# Patient Record
Sex: FEMALE | Race: WHITE | HISPANIC OR LATINO
[De-identification: names, ages, dates, MRNs, and addresses within clinical notes are randomized per-mention and may not be internally consistent; named-entity substitution may affect disease eponyms.]

---

## 2017-01-27 ENCOUNTER — APPOINTMENT (OUTPATIENT)
Dept: OPHTHALMOLOGY | Facility: CLINIC | Age: 62
End: 2017-01-27

## 2017-01-27 ENCOUNTER — OUTPATIENT (OUTPATIENT)
Dept: OUTPATIENT SERVICES | Facility: HOSPITAL | Age: 62
LOS: 1 days | End: 2017-01-27

## 2017-01-27 DIAGNOSIS — Z98.89 OTHER SPECIFIED POSTPROCEDURAL STATES: Chronic | ICD-10-CM

## 2017-02-08 DIAGNOSIS — H40.003 PREGLAUCOMA, UNSPECIFIED, BILATERAL: ICD-10-CM

## 2017-02-22 ENCOUNTER — OUTPATIENT (OUTPATIENT)
Dept: OUTPATIENT SERVICES | Facility: HOSPITAL | Age: 62
LOS: 1 days | End: 2017-02-22

## 2017-02-22 ENCOUNTER — APPOINTMENT (OUTPATIENT)
Dept: OBGYN | Facility: HOSPITAL | Age: 62
End: 2017-02-22

## 2017-02-22 VITALS
WEIGHT: 169 LBS | SYSTOLIC BLOOD PRESSURE: 112 MMHG | HEIGHT: 68 IN | BODY MASS INDEX: 25.61 KG/M2 | DIASTOLIC BLOOD PRESSURE: 75 MMHG | HEART RATE: 70 BPM

## 2017-02-22 DIAGNOSIS — Z98.89 OTHER SPECIFIED POSTPROCEDURAL STATES: Chronic | ICD-10-CM

## 2017-02-23 ENCOUNTER — RESULT REVIEW (OUTPATIENT)
Age: 62
End: 2017-02-23

## 2017-02-23 DIAGNOSIS — Z01.419 ENCOUNTER FOR GYNECOLOGICAL EXAMINATION (GENERAL) (ROUTINE) WITHOUT ABNORMAL FINDINGS: ICD-10-CM

## 2017-02-23 DIAGNOSIS — R32 UNSPECIFIED URINARY INCONTINENCE: ICD-10-CM

## 2017-02-28 LAB — CYTOLOGY SPEC DOC CYTO: SIGNIFICANT CHANGE UP

## 2017-03-01 LAB — HPV HIGH+LOW RISK DNA PNL CVX: SIGNIFICANT CHANGE UP

## 2017-03-27 ENCOUNTER — APPOINTMENT (OUTPATIENT)
Dept: UROGYNECOLOGY | Facility: CLINIC | Age: 62
End: 2017-03-27

## 2017-03-27 VITALS
WEIGHT: 170 LBS | BODY MASS INDEX: 25.76 KG/M2 | HEIGHT: 68 IN | DIASTOLIC BLOOD PRESSURE: 76 MMHG | SYSTOLIC BLOOD PRESSURE: 130 MMHG

## 2017-03-27 DIAGNOSIS — Z82.5 FAMILY HISTORY OF ASTHMA AND OTHER CHRONIC LOWER RESPIRATORY DISEASES: ICD-10-CM

## 2017-03-27 DIAGNOSIS — Z83.42 FAMILY HISTORY OF FAMILIAL HYPERCHOLESTEROLEMIA: ICD-10-CM

## 2017-03-27 DIAGNOSIS — Z80.3 FAMILY HISTORY OF MALIGNANT NEOPLASM OF BREAST: ICD-10-CM

## 2017-03-27 DIAGNOSIS — Z82.49 FAMILY HISTORY OF ISCHEMIC HEART DISEASE AND OTHER DISEASES OF THE CIRCULATORY SYSTEM: ICD-10-CM

## 2017-03-27 DIAGNOSIS — Z84.1 FAMILY HISTORY OF DISORDERS OF KIDNEY AND URETER: ICD-10-CM

## 2017-03-27 LAB
BILIRUB UR QL STRIP: NORMAL
CLARITY UR: CLEAR
COLLECTION METHOD: NORMAL
GLUCOSE UR-MCNC: NORMAL
HCG UR QL: 0.2 EU/DL
HGB UR QL STRIP.AUTO: NORMAL
KETONES UR-MCNC: NORMAL
LEUKOCYTE ESTERASE UR QL STRIP: NORMAL
NITRITE UR QL STRIP: NORMAL
PH UR STRIP: 5.5
PROT UR STRIP-MCNC: NORMAL
SP GR UR STRIP: 1

## 2017-03-28 ENCOUNTER — RESULT REVIEW (OUTPATIENT)
Age: 62
End: 2017-03-28

## 2017-03-28 LAB
APPEARANCE: CLEAR
BACTERIA: NEGATIVE
BILIRUBIN URINE: NEGATIVE
BLOOD URINE: NEGATIVE
COLOR: YELLOW
GLUCOSE QUALITATIVE U: NORMAL MG/DL
KETONES URINE: NEGATIVE
LEUKOCYTE ESTERASE URINE: NEGATIVE
MICROSCOPIC-UA: NORMAL
NITRITE URINE: NEGATIVE
PH URINE: 6
PROTEIN URINE: NEGATIVE MG/DL
RED BLOOD CELLS URINE: 0 /HPF
SPECIFIC GRAVITY URINE: 1.01
SQUAMOUS EPITHELIAL CELLS: 0 /HPF
UROBILINOGEN URINE: NORMAL MG/DL
WHITE BLOOD CELLS URINE: 0 /HPF

## 2017-03-29 LAB — BACTERIA UR CULT: NORMAL

## 2017-04-17 ENCOUNTER — APPOINTMENT (OUTPATIENT)
Dept: UROGYNECOLOGY | Facility: CLINIC | Age: 62
End: 2017-04-17

## 2017-04-24 ENCOUNTER — APPOINTMENT (OUTPATIENT)
Dept: UROGYNECOLOGY | Facility: CLINIC | Age: 62
End: 2017-04-24

## 2017-05-01 ENCOUNTER — APPOINTMENT (OUTPATIENT)
Dept: UROGYNECOLOGY | Facility: CLINIC | Age: 62
End: 2017-05-01

## 2017-05-08 ENCOUNTER — APPOINTMENT (OUTPATIENT)
Dept: UROGYNECOLOGY | Facility: CLINIC | Age: 62
End: 2017-05-08

## 2017-05-15 ENCOUNTER — APPOINTMENT (OUTPATIENT)
Dept: UROGYNECOLOGY | Facility: CLINIC | Age: 62
End: 2017-05-15

## 2017-05-22 ENCOUNTER — APPOINTMENT (OUTPATIENT)
Dept: UROGYNECOLOGY | Facility: CLINIC | Age: 62
End: 2017-05-22

## 2017-05-22 RX ORDER — MIRABEGRON 25 MG/1
25 TABLET, FILM COATED, EXTENDED RELEASE ORAL
Qty: 15 | Refills: 0 | Status: DISCONTINUED | COMMUNITY
Start: 2017-03-27 | End: 2017-05-22

## 2017-05-31 ENCOUNTER — APPOINTMENT (OUTPATIENT)
Dept: UROGYNECOLOGY | Facility: CLINIC | Age: 62
End: 2017-05-31

## 2017-06-07 ENCOUNTER — APPOINTMENT (OUTPATIENT)
Dept: UROGYNECOLOGY | Facility: CLINIC | Age: 62
End: 2017-06-07

## 2017-06-14 ENCOUNTER — APPOINTMENT (OUTPATIENT)
Dept: UROGYNECOLOGY | Facility: CLINIC | Age: 62
End: 2017-06-14

## 2017-06-21 ENCOUNTER — APPOINTMENT (OUTPATIENT)
Dept: UROGYNECOLOGY | Facility: CLINIC | Age: 62
End: 2017-06-21

## 2017-06-28 ENCOUNTER — APPOINTMENT (OUTPATIENT)
Dept: UROGYNECOLOGY | Facility: CLINIC | Age: 62
End: 2017-06-28

## 2017-07-11 ENCOUNTER — APPOINTMENT (OUTPATIENT)
Dept: UROGYNECOLOGY | Facility: CLINIC | Age: 62
End: 2017-07-11

## 2017-07-21 ENCOUNTER — APPOINTMENT (OUTPATIENT)
Dept: UROGYNECOLOGY | Facility: CLINIC | Age: 62
End: 2017-07-21

## 2017-08-21 ENCOUNTER — APPOINTMENT (OUTPATIENT)
Dept: UROGYNECOLOGY | Facility: CLINIC | Age: 62
End: 2017-08-21
Payer: MEDICAID

## 2017-08-21 PROCEDURE — 64566 NEUROELTRD STIM POST TIBIAL: CPT

## 2017-08-24 ENCOUNTER — APPOINTMENT (OUTPATIENT)
Dept: OPHTHALMOLOGY | Facility: CLINIC | Age: 62
End: 2017-08-24

## 2017-08-24 ENCOUNTER — APPOINTMENT (OUTPATIENT)
Dept: ORTHOPEDIC SURGERY | Facility: CLINIC | Age: 62
End: 2017-08-24
Payer: MEDICAID

## 2017-08-24 ENCOUNTER — OUTPATIENT (OUTPATIENT)
Dept: OUTPATIENT SERVICES | Facility: HOSPITAL | Age: 62
LOS: 1 days | End: 2017-08-24

## 2017-08-24 VITALS
BODY MASS INDEX: 25.76 KG/M2 | WEIGHT: 170 LBS | SYSTOLIC BLOOD PRESSURE: 118 MMHG | HEART RATE: 83 BPM | HEIGHT: 68 IN | DIASTOLIC BLOOD PRESSURE: 83 MMHG

## 2017-08-24 DIAGNOSIS — Z98.89 OTHER SPECIFIED POSTPROCEDURAL STATES: Chronic | ICD-10-CM

## 2017-08-24 PROCEDURE — 99213 OFFICE O/P EST LOW 20 MIN: CPT

## 2017-08-25 ENCOUNTER — RX RENEWAL (OUTPATIENT)
Age: 62
End: 2017-08-25

## 2017-08-28 ENCOUNTER — MEDICATION RENEWAL (OUTPATIENT)
Age: 62
End: 2017-08-28

## 2017-09-12 DIAGNOSIS — H40.003 PREGLAUCOMA, UNSPECIFIED, BILATERAL: ICD-10-CM

## 2017-09-19 ENCOUNTER — APPOINTMENT (OUTPATIENT)
Dept: ORTHOPEDIC SURGERY | Facility: CLINIC | Age: 62
End: 2017-09-19
Payer: MEDICAID

## 2017-09-19 VITALS
HEART RATE: 76 BPM | WEIGHT: 170 LBS | DIASTOLIC BLOOD PRESSURE: 79 MMHG | BODY MASS INDEX: 25.76 KG/M2 | HEIGHT: 68 IN | SYSTOLIC BLOOD PRESSURE: 118 MMHG

## 2017-09-19 PROCEDURE — 20610 DRAIN/INJ JOINT/BURSA W/O US: CPT | Mod: LT

## 2017-09-25 ENCOUNTER — APPOINTMENT (OUTPATIENT)
Dept: UROGYNECOLOGY | Facility: CLINIC | Age: 62
End: 2017-09-25
Payer: MEDICAID

## 2017-09-25 PROCEDURE — 64566 NEUROELTRD STIM POST TIBIAL: CPT

## 2017-10-17 ENCOUNTER — APPOINTMENT (OUTPATIENT)
Dept: INTERNAL MEDICINE | Facility: CLINIC | Age: 62
End: 2017-10-17
Payer: MEDICAID

## 2017-10-17 ENCOUNTER — NON-APPOINTMENT (OUTPATIENT)
Age: 62
End: 2017-10-17

## 2017-10-17 VITALS
HEART RATE: 88 BPM | OXYGEN SATURATION: 97 % | SYSTOLIC BLOOD PRESSURE: 120 MMHG | WEIGHT: 170 LBS | HEIGHT: 68 IN | TEMPERATURE: 97.9 F | BODY MASS INDEX: 25.76 KG/M2 | DIASTOLIC BLOOD PRESSURE: 80 MMHG

## 2017-10-17 DIAGNOSIS — Z00.00 ENCOUNTER FOR GENERAL ADULT MEDICAL EXAMINATION W/OUT ABNORMAL FINDINGS: ICD-10-CM

## 2017-10-17 PROCEDURE — 90471 IMMUNIZATION ADMIN: CPT

## 2017-10-17 PROCEDURE — 36415 COLL VENOUS BLD VENIPUNCTURE: CPT

## 2017-10-17 PROCEDURE — 99396 PREV VISIT EST AGE 40-64: CPT | Mod: 25

## 2017-10-17 PROCEDURE — 93000 ELECTROCARDIOGRAM COMPLETE: CPT

## 2017-10-17 PROCEDURE — 90736 HZV VACCINE LIVE SUBQ: CPT

## 2017-10-17 RX ORDER — MIRABEGRON 25 MG/1
25 TABLET, FILM COATED, EXTENDED RELEASE ORAL
Qty: 90 | Refills: 1 | Status: DISCONTINUED | COMMUNITY
Start: 2017-08-21 | End: 2017-10-17

## 2017-10-17 RX ORDER — HYALURONATE SODIUM, STABILIZED 88 MG/4 ML
88 SYRINGE (ML) INTRAARTICULAR
Qty: 4 | Refills: 0 | Status: DISCONTINUED | OUTPATIENT
Start: 2017-08-24 | End: 2017-10-17

## 2017-10-17 RX ORDER — MULTIVITAMIN
CAPSULE ORAL
Refills: 0 | Status: ACTIVE | COMMUNITY
Start: 2017-10-17

## 2017-10-21 VITALS — DIASTOLIC BLOOD PRESSURE: 75 MMHG | SYSTOLIC BLOOD PRESSURE: 115 MMHG

## 2017-10-30 ENCOUNTER — APPOINTMENT (OUTPATIENT)
Dept: UROGYNECOLOGY | Facility: CLINIC | Age: 62
End: 2017-10-30
Payer: MEDICAID

## 2017-10-30 LAB
25(OH)D3 SERPL-MCNC: 33.7 NG/ML
ALBUMIN SERPL ELPH-MCNC: 4.3 G/DL
ALP BLD-CCNC: 65 U/L
ALT SERPL-CCNC: 78 U/L
ANION GAP SERPL CALC-SCNC: 13 MMOL/L
APPEARANCE: CLEAR
AST SERPL-CCNC: 47 U/L
BACTERIA: NEGATIVE
BASOPHILS # BLD AUTO: 0.03 K/UL
BASOPHILS NFR BLD AUTO: 0.7 %
BILIRUB SERPL-MCNC: 0.3 MG/DL
BILIRUBIN URINE: NEGATIVE
BLOOD URINE: NEGATIVE
BUN SERPL-MCNC: 14 MG/DL
CALCIUM SERPL-MCNC: 10 MG/DL
CHLORIDE SERPL-SCNC: 102 MMOL/L
CHOLEST SERPL-MCNC: 196 MG/DL
CHOLEST/HDLC SERPL: 3.2 RATIO
CO2 SERPL-SCNC: 27 MMOL/L
COLOR: YELLOW
CREAT SERPL-MCNC: 0.82 MG/DL
EOSINOPHIL # BLD AUTO: 0.04 K/UL
EOSINOPHIL NFR BLD AUTO: 0.9 %
GLUCOSE QUALITATIVE U: NEGATIVE MG/DL
GLUCOSE SERPL-MCNC: 102 MG/DL
HBA1C MFR BLD HPLC: 5.6 %
HCT VFR BLD CALC: 39.7 %
HDLC SERPL-MCNC: 62 MG/DL
HGB BLD-MCNC: 13.1 G/DL
IMM GRANULOCYTES NFR BLD AUTO: 0.2 %
KETONES URINE: NEGATIVE
LDLC SERPL CALC-MCNC: 104 MG/DL
LEUKOCYTE ESTERASE URINE: NEGATIVE
LYMPHOCYTES # BLD AUTO: 1.14 K/UL
LYMPHOCYTES NFR BLD AUTO: 26.7 %
MAN DIFF?: NORMAL
MCHC RBC-ENTMCNC: 31 PG
MCHC RBC-ENTMCNC: 33 GM/DL
MCV RBC AUTO: 93.9 FL
MICROSCOPIC-UA: NORMAL
MONOCYTES # BLD AUTO: 0.35 K/UL
MONOCYTES NFR BLD AUTO: 8.2 %
NEUTROPHILS # BLD AUTO: 2.7 K/UL
NEUTROPHILS NFR BLD AUTO: 63.3 %
NITRITE URINE: NEGATIVE
PH URINE: 5.5
PLATELET # BLD AUTO: 209 K/UL
POTASSIUM SERPL-SCNC: 3.8 MMOL/L
PROT SERPL-MCNC: 7.5 G/DL
PROTEIN URINE: NEGATIVE MG/DL
RBC # BLD: 4.23 M/UL
RBC # FLD: 13.4 %
RED BLOOD CELLS URINE: 0 /HPF
SODIUM SERPL-SCNC: 142 MMOL/L
SPECIFIC GRAVITY URINE: 1.01
SQUAMOUS EPITHELIAL CELLS: 0 /HPF
T4 FREE SERPL-MCNC: 1.3 NG/DL
TRIGL SERPL-MCNC: 152 MG/DL
TSH SERPL-ACNC: 1.13 UIU/ML
UROBILINOGEN URINE: NEGATIVE MG/DL
WBC # FLD AUTO: 4.27 K/UL
WHITE BLOOD CELLS URINE: 1 /HPF

## 2017-10-30 PROCEDURE — 64566 NEUROELTRD STIM POST TIBIAL: CPT

## 2017-11-07 ENCOUNTER — APPOINTMENT (OUTPATIENT)
Dept: INTERNAL MEDICINE | Facility: CLINIC | Age: 62
End: 2017-11-07
Payer: MEDICAID

## 2017-11-07 PROCEDURE — 90686 IIV4 VACC NO PRSV 0.5 ML IM: CPT

## 2017-11-07 PROCEDURE — G0008: CPT

## 2017-11-14 LAB
ALBUMIN SERPL ELPH-MCNC: 4.2 G/DL
ALP BLD-CCNC: 54 U/L
ALT SERPL-CCNC: 16 U/L
AST SERPL-CCNC: 29 U/L
BILIRUB DIRECT SERPL-MCNC: 0.1 MG/DL
BILIRUB INDIRECT SERPL-MCNC: 0.3 MG/DL
BILIRUB SERPL-MCNC: 0.4 MG/DL
FERRITIN SERPL-MCNC: 203 NG/ML
HBV SURFACE AB SER QL: NONREACTIVE
HBV SURFACE AG SER QL: NONREACTIVE
HCV AB SER QL: NONREACTIVE
HCV S/CO RATIO: 0.11 S/CO
IRON SATN MFR SERPL: 34 %
IRON SERPL-MCNC: 88 UG/DL
PROT SERPL-MCNC: 7.9 G/DL
TIBC SERPL-MCNC: 258 UG/DL
UIBC SERPL-MCNC: 170 UG/DL

## 2017-11-20 ENCOUNTER — APPOINTMENT (OUTPATIENT)
Dept: ORTHOPEDIC SURGERY | Facility: CLINIC | Age: 62
End: 2017-11-20
Payer: MEDICAID

## 2017-11-20 VITALS
WEIGHT: 170 LBS | BODY MASS INDEX: 25.76 KG/M2 | SYSTOLIC BLOOD PRESSURE: 129 MMHG | HEART RATE: 89 BPM | DIASTOLIC BLOOD PRESSURE: 85 MMHG | HEIGHT: 68 IN

## 2017-11-20 PROCEDURE — 73564 X-RAY EXAM KNEE 4 OR MORE: CPT | Mod: LT

## 2017-11-20 PROCEDURE — 99213 OFFICE O/P EST LOW 20 MIN: CPT

## 2017-11-27 ENCOUNTER — APPOINTMENT (OUTPATIENT)
Dept: UROGYNECOLOGY | Facility: CLINIC | Age: 62
End: 2017-11-27
Payer: MEDICAID

## 2017-11-27 PROCEDURE — 64566 NEUROELTRD STIM POST TIBIAL: CPT

## 2017-12-10 ENCOUNTER — FORM ENCOUNTER (OUTPATIENT)
Age: 62
End: 2017-12-10

## 2017-12-11 ENCOUNTER — OUTPATIENT (OUTPATIENT)
Dept: OUTPATIENT SERVICES | Facility: HOSPITAL | Age: 62
LOS: 1 days | End: 2017-12-11
Payer: MEDICAID

## 2017-12-11 ENCOUNTER — APPOINTMENT (OUTPATIENT)
Dept: MAMMOGRAPHY | Facility: IMAGING CENTER | Age: 62
End: 2017-12-11
Payer: MEDICAID

## 2017-12-11 ENCOUNTER — APPOINTMENT (OUTPATIENT)
Dept: ULTRASOUND IMAGING | Facility: IMAGING CENTER | Age: 62
End: 2017-12-11
Payer: MEDICAID

## 2017-12-11 DIAGNOSIS — I74.9 EMBOLISM AND THROMBOSIS OF UNSPECIFIED ARTERY: ICD-10-CM

## 2017-12-11 DIAGNOSIS — Z98.89 OTHER SPECIFIED POSTPROCEDURAL STATES: Chronic | ICD-10-CM

## 2017-12-11 DIAGNOSIS — C50.911 MALIGNANT NEOPLASM OF UNSPECIFIED SITE OF RIGHT FEMALE BREAST: ICD-10-CM

## 2017-12-11 PROCEDURE — 77063 BREAST TOMOSYNTHESIS BI: CPT

## 2017-12-11 PROCEDURE — 76641 ULTRASOUND BREAST COMPLETE: CPT

## 2017-12-11 PROCEDURE — G0202: CPT | Mod: 26

## 2017-12-11 PROCEDURE — 77063 BREAST TOMOSYNTHESIS BI: CPT | Mod: 26

## 2017-12-11 PROCEDURE — 76641 ULTRASOUND BREAST COMPLETE: CPT | Mod: 26,50

## 2017-12-11 PROCEDURE — 77067 SCR MAMMO BI INCL CAD: CPT

## 2017-12-22 ENCOUNTER — OUTPATIENT (OUTPATIENT)
Dept: OUTPATIENT SERVICES | Facility: HOSPITAL | Age: 62
LOS: 1 days | Discharge: HOME | End: 2017-12-22

## 2017-12-22 DIAGNOSIS — Z98.89 OTHER SPECIFIED POSTPROCEDURAL STATES: Chronic | ICD-10-CM

## 2017-12-22 DIAGNOSIS — C50.919 MALIGNANT NEOPLASM OF UNSPECIFIED SITE OF UNSPECIFIED FEMALE BREAST: ICD-10-CM

## 2018-01-03 ENCOUNTER — APPOINTMENT (OUTPATIENT)
Dept: UROGYNECOLOGY | Facility: CLINIC | Age: 63
End: 2018-01-03
Payer: MEDICAID

## 2018-01-03 ENCOUNTER — APPOINTMENT (OUTPATIENT)
Dept: HEMATOLOGY ONCOLOGY | Facility: CLINIC | Age: 63
End: 2018-01-03
Payer: MEDICAID

## 2018-01-03 VITALS
OXYGEN SATURATION: 98 % | HEART RATE: 78 BPM | WEIGHT: 174.14 LBS | TEMPERATURE: 98.2 F | SYSTOLIC BLOOD PRESSURE: 120 MMHG | DIASTOLIC BLOOD PRESSURE: 80 MMHG | RESPIRATION RATE: 16 BRPM | BODY MASS INDEX: 26.48 KG/M2

## 2018-01-03 PROCEDURE — 64566 NEUROELTRD STIM POST TIBIAL: CPT

## 2018-01-03 PROCEDURE — 99213 OFFICE O/P EST LOW 20 MIN: CPT

## 2018-01-03 RX ORDER — MELOXICAM 15 MG/1
15 TABLET ORAL DAILY
Qty: 30 | Refills: 0 | Status: DISCONTINUED | COMMUNITY
Start: 2017-08-24 | End: 2018-01-03

## 2018-01-03 RX ORDER — MELOXICAM 15 MG/1
15 TABLET ORAL DAILY
Qty: 30 | Refills: 0 | Status: DISCONTINUED | COMMUNITY
Start: 2017-08-25 | End: 2018-01-03

## 2018-02-05 ENCOUNTER — OUTPATIENT (OUTPATIENT)
Dept: OUTPATIENT SERVICES | Facility: HOSPITAL | Age: 63
LOS: 1 days | End: 2018-02-05

## 2018-02-05 ENCOUNTER — APPOINTMENT (OUTPATIENT)
Dept: OPHTHALMOLOGY | Facility: CLINIC | Age: 63
End: 2018-02-05

## 2018-02-05 DIAGNOSIS — Z98.89 OTHER SPECIFIED POSTPROCEDURAL STATES: Chronic | ICD-10-CM

## 2018-02-14 ENCOUNTER — APPOINTMENT (OUTPATIENT)
Dept: UROGYNECOLOGY | Facility: CLINIC | Age: 63
End: 2018-02-14
Payer: MEDICAID

## 2018-02-14 PROCEDURE — 64566 NEUROELTRD STIM POST TIBIAL: CPT

## 2018-03-06 ENCOUNTER — APPOINTMENT (OUTPATIENT)
Dept: ORTHOPEDIC SURGERY | Facility: CLINIC | Age: 63
End: 2018-03-06

## 2018-03-06 DIAGNOSIS — H40.003 PREGLAUCOMA, UNSPECIFIED, BILATERAL: ICD-10-CM

## 2018-03-21 ENCOUNTER — APPOINTMENT (OUTPATIENT)
Dept: ORTHOPEDIC SURGERY | Facility: CLINIC | Age: 63
End: 2018-03-21
Payer: MEDICAID

## 2018-03-21 VITALS
DIASTOLIC BLOOD PRESSURE: 78 MMHG | SYSTOLIC BLOOD PRESSURE: 118 MMHG | HEIGHT: 68 IN | HEART RATE: 75 BPM | WEIGHT: 168 LBS | BODY MASS INDEX: 25.46 KG/M2

## 2018-03-21 DIAGNOSIS — Z82.61 FAMILY HISTORY OF ARTHRITIS: ICD-10-CM

## 2018-03-21 PROCEDURE — 99214 OFFICE O/P EST MOD 30 MIN: CPT

## 2018-03-28 ENCOUNTER — OUTPATIENT (OUTPATIENT)
Dept: OUTPATIENT SERVICES | Facility: HOSPITAL | Age: 63
LOS: 1 days | End: 2018-03-28
Payer: MEDICAID

## 2018-03-28 VITALS
DIASTOLIC BLOOD PRESSURE: 84 MMHG | HEART RATE: 66 BPM | HEIGHT: 67 IN | RESPIRATION RATE: 18 BRPM | OXYGEN SATURATION: 100 % | TEMPERATURE: 98 F | WEIGHT: 176.37 LBS | SYSTOLIC BLOOD PRESSURE: 130 MMHG

## 2018-03-28 DIAGNOSIS — Z01.818 ENCOUNTER FOR OTHER PREPROCEDURAL EXAMINATION: ICD-10-CM

## 2018-03-28 DIAGNOSIS — Z98.89 OTHER SPECIFIED POSTPROCEDURAL STATES: Chronic | ICD-10-CM

## 2018-03-28 DIAGNOSIS — Z98.890 OTHER SPECIFIED POSTPROCEDURAL STATES: Chronic | ICD-10-CM

## 2018-03-28 DIAGNOSIS — M25.562 PAIN IN LEFT KNEE: ICD-10-CM

## 2018-03-28 DIAGNOSIS — M17.12 UNILATERAL PRIMARY OSTEOARTHRITIS, LEFT KNEE: ICD-10-CM

## 2018-03-28 LAB
ALBUMIN SERPL ELPH-MCNC: 3.9 G/DL — SIGNIFICANT CHANGE UP (ref 3.3–5)
ALP SERPL-CCNC: 63 U/L — SIGNIFICANT CHANGE UP (ref 30–120)
ALT FLD-CCNC: 36 U/L DA — SIGNIFICANT CHANGE UP (ref 10–60)
ANION GAP SERPL CALC-SCNC: 5 MMOL/L — SIGNIFICANT CHANGE UP (ref 5–17)
APTT BLD: 28.6 SEC — SIGNIFICANT CHANGE UP (ref 27.5–37.4)
AST SERPL-CCNC: 27 U/L — SIGNIFICANT CHANGE UP (ref 10–40)
BILIRUB SERPL-MCNC: 0.5 MG/DL — SIGNIFICANT CHANGE UP (ref 0.2–1.2)
BLD GP AB SCN SERPL QL: SIGNIFICANT CHANGE UP
BUN SERPL-MCNC: 20 MG/DL — SIGNIFICANT CHANGE UP (ref 7–23)
CALCIUM SERPL-MCNC: 9.6 MG/DL — SIGNIFICANT CHANGE UP (ref 8.4–10.5)
CHLORIDE SERPL-SCNC: 102 MMOL/L — SIGNIFICANT CHANGE UP (ref 96–108)
CO2 SERPL-SCNC: 32 MMOL/L — HIGH (ref 22–31)
CREAT SERPL-MCNC: 0.86 MG/DL — SIGNIFICANT CHANGE UP (ref 0.5–1.3)
GLUCOSE SERPL-MCNC: 117 MG/DL — HIGH (ref 70–99)
HCT VFR BLD CALC: 41 % — SIGNIFICANT CHANGE UP (ref 34.5–45)
HGB BLD-MCNC: 13.8 G/DL — SIGNIFICANT CHANGE UP (ref 11.5–15.5)
INR BLD: 1.04 RATIO — SIGNIFICANT CHANGE UP (ref 0.88–1.16)
MCHC RBC-ENTMCNC: 30.8 PG — SIGNIFICANT CHANGE UP (ref 27–34)
MCHC RBC-ENTMCNC: 33.6 GM/DL — SIGNIFICANT CHANGE UP (ref 32–36)
MCV RBC AUTO: 91.7 FL — SIGNIFICANT CHANGE UP (ref 80–100)
PLATELET # BLD AUTO: 211 K/UL — SIGNIFICANT CHANGE UP (ref 150–400)
POTASSIUM SERPL-MCNC: 4.2 MMOL/L — SIGNIFICANT CHANGE UP (ref 3.5–5.3)
POTASSIUM SERPL-SCNC: 4.2 MMOL/L — SIGNIFICANT CHANGE UP (ref 3.5–5.3)
PROT SERPL-MCNC: 7.9 G/DL — SIGNIFICANT CHANGE UP (ref 6–8.3)
PROTHROM AB SERPL-ACNC: 11.3 SEC — SIGNIFICANT CHANGE UP (ref 9.8–12.7)
RBC # BLD: 4.48 M/UL — SIGNIFICANT CHANGE UP (ref 3.8–5.2)
RBC # FLD: 11.7 % — SIGNIFICANT CHANGE UP (ref 10.3–14.5)
SODIUM SERPL-SCNC: 139 MMOL/L — SIGNIFICANT CHANGE UP (ref 135–145)
WBC # BLD: 3.3 K/UL — LOW (ref 3.8–10.5)
WBC # FLD AUTO: 3.3 K/UL — LOW (ref 3.8–10.5)

## 2018-03-28 PROCEDURE — 86850 RBC ANTIBODY SCREEN: CPT

## 2018-03-28 PROCEDURE — 86900 BLOOD TYPING SEROLOGIC ABO: CPT

## 2018-03-28 PROCEDURE — G0463: CPT

## 2018-03-28 PROCEDURE — 85610 PROTHROMBIN TIME: CPT

## 2018-03-28 PROCEDURE — 87640 STAPH A DNA AMP PROBE: CPT

## 2018-03-28 PROCEDURE — 85730 THROMBOPLASTIN TIME PARTIAL: CPT

## 2018-03-28 PROCEDURE — 86901 BLOOD TYPING SEROLOGIC RH(D): CPT

## 2018-03-28 PROCEDURE — 87641 MR-STAPH DNA AMP PROBE: CPT

## 2018-03-28 PROCEDURE — 93010 ELECTROCARDIOGRAM REPORT: CPT | Mod: NC

## 2018-03-28 PROCEDURE — 80053 COMPREHEN METABOLIC PANEL: CPT

## 2018-03-28 PROCEDURE — 85027 COMPLETE CBC AUTOMATED: CPT

## 2018-03-28 PROCEDURE — 93005 ELECTROCARDIOGRAM TRACING: CPT

## 2018-03-28 RX ORDER — MULTIVIT-MIN/FERROUS GLUCONATE 9 MG/15 ML
1 LIQUID (ML) ORAL
Qty: 0 | Refills: 0 | COMMUNITY

## 2018-03-28 RX ORDER — OMEGA-3 ACID ETHYL ESTERS 1 G
1 CAPSULE ORAL
Qty: 0 | Refills: 0 | COMMUNITY

## 2018-03-28 RX ORDER — MELOXICAM 15 MG/1
1 TABLET ORAL
Qty: 0 | Refills: 0 | COMMUNITY

## 2018-03-28 NOTE — H&P PST ADULT - PSH
History of lumpectomy  Right- 2004  S/P bunionectomy  left History of facial surgery  jaw correction as teenager age 19  History of lumpectomy  Right- 2004  S/P bunionectomy  left

## 2018-03-28 NOTE — H&P PST ADULT - MUSCULOSKELETAL
details… detailed exam no calf tenderness/decreased ROM due to pain/diminished strength/joint swelling

## 2018-03-28 NOTE — H&P PST ADULT - HISTORY OF PRESENT ILLNESS
61 yo female presents with long term history (30+ years) of intermittent left knee pain accompanied by swelling.  Initially treated with PT, which helped that pain at that time.   Pt states that 1 1/2 years ago she fell and the pain and swelling began to occur again.  Pt has been treated in past with cortisone and gel injections  that offered no temporary relief.  Pt currently wears a knee brace for support.  Pt takes Mobic for pain which she will stop on Friday.

## 2018-03-28 NOTE — H&P PST ADULT - FAMILY HISTORY
Mother  Still living? No  Family history of diabetes mellitus in mother, Age at diagnosis: Age Unknown  Family history of sepsis, Age at diagnosis: Age Unknown     Father  Still living? No  Family history of cerebrovascular accident (CVA) in father, Age at diagnosis: Age Unknown     Sibling  Still living? Unknown  Family history of acute lymphoid leukemia, Age at diagnosis: Age Unknown

## 2018-03-28 NOTE — H&P PST ADULT - PMH
Breast cancer  right breast 2004, treated with lumpectomy and RT  Chronic pain of left knee    Leukopenia  chronic as per pt, was present prior to RT  Urge incontinence of urine  treated with ash tibial nerve stimulation (PTNS)

## 2018-03-28 NOTE — H&P PST ADULT - NSANTHOSAYNRD_GEN_A_CORE
No. ASHLEIGH screening performed.  STOP BANG Legend: 0-2 = LOW Risk; 3-4 = INTERMEDIATE Risk; 5-8 = HIGH Risk

## 2018-03-29 ENCOUNTER — APPOINTMENT (OUTPATIENT)
Dept: INTERNAL MEDICINE | Facility: CLINIC | Age: 63
End: 2018-03-29
Payer: MEDICAID

## 2018-03-29 VITALS
WEIGHT: 178 LBS | HEART RATE: 70 BPM | TEMPERATURE: 98.7 F | DIASTOLIC BLOOD PRESSURE: 80 MMHG | BODY MASS INDEX: 26.98 KG/M2 | OXYGEN SATURATION: 97 % | HEIGHT: 68 IN | SYSTOLIC BLOOD PRESSURE: 120 MMHG

## 2018-03-29 DIAGNOSIS — R74.8 ABNORMAL LEVELS OF OTHER SERUM ENZYMES: ICD-10-CM

## 2018-03-29 DIAGNOSIS — Z92.29 PERSONAL HISTORY OF OTHER DRUG THERAPY: ICD-10-CM

## 2018-03-29 LAB
MRSA PCR RESULT.: SIGNIFICANT CHANGE UP
S AUREUS DNA NOSE QL NAA+PROBE: SIGNIFICANT CHANGE UP

## 2018-03-29 PROCEDURE — 99214 OFFICE O/P EST MOD 30 MIN: CPT | Mod: 25

## 2018-03-29 PROCEDURE — 36415 COLL VENOUS BLD VENIPUNCTURE: CPT

## 2018-04-02 ENCOUNTER — APPOINTMENT (OUTPATIENT)
Dept: UROGYNECOLOGY | Facility: CLINIC | Age: 63
End: 2018-04-02
Payer: MEDICAID

## 2018-04-02 VITALS — SYSTOLIC BLOOD PRESSURE: 115 MMHG | DIASTOLIC BLOOD PRESSURE: 80 MMHG

## 2018-04-02 LAB
BASOPHILS # BLD AUTO: 0.02 K/UL
BASOPHILS NFR BLD AUTO: 0.6 %
EOSINOPHIL # BLD AUTO: 0.09 K/UL
EOSINOPHIL NFR BLD AUTO: 2.5 %
HBA1C MFR BLD HPLC: 5.6 %
HCT VFR BLD CALC: 41.4 %
HGB BLD-MCNC: 13.3 G/DL
IMM GRANULOCYTES NFR BLD AUTO: 0.3 %
LYMPHOCYTES # BLD AUTO: 1.2 K/UL
LYMPHOCYTES NFR BLD AUTO: 33.4 %
MAN DIFF?: NORMAL
MCHC RBC-ENTMCNC: 30.2 PG
MCHC RBC-ENTMCNC: 32.1 GM/DL
MCV RBC AUTO: 94.1 FL
MONOCYTES # BLD AUTO: 0.31 K/UL
MONOCYTES NFR BLD AUTO: 8.6 %
NEUTROPHILS # BLD AUTO: 1.96 K/UL
NEUTROPHILS NFR BLD AUTO: 54.6 %
PLATELET # BLD AUTO: 220 K/UL
RBC # BLD: 4.4 M/UL
RBC # FLD: 12.9 %
WBC # FLD AUTO: 3.59 K/UL

## 2018-04-02 PROCEDURE — 64566 NEUROELTRD STIM POST TIBIAL: CPT

## 2018-04-04 DIAGNOSIS — Z60.2 PROBLEMS RELATED TO LIVING ALONE: ICD-10-CM

## 2018-04-04 SDOH — SOCIAL STABILITY - SOCIAL INSECURITY: PROBLEMS RELATED TO LIVING ALONE: Z60.2

## 2018-05-11 ENCOUNTER — APPOINTMENT (OUTPATIENT)
Dept: UROGYNECOLOGY | Facility: CLINIC | Age: 63
End: 2018-05-11
Payer: MEDICAID

## 2018-05-11 DIAGNOSIS — R32 UNSPECIFIED URINARY INCONTINENCE: ICD-10-CM

## 2018-05-11 PROCEDURE — 64566 NEUROELTRD STIM POST TIBIAL: CPT

## 2018-06-22 ENCOUNTER — APPOINTMENT (OUTPATIENT)
Dept: UROGYNECOLOGY | Facility: CLINIC | Age: 63
End: 2018-06-22
Payer: MEDICAID

## 2018-06-22 PROCEDURE — 64566 NEUROELTRD STIM POST TIBIAL: CPT

## 2018-06-29 ENCOUNTER — EMERGENCY (EMERGENCY)
Facility: HOSPITAL | Age: 63
LOS: 1 days | Discharge: HOME | End: 2018-06-29
Attending: EMERGENCY MEDICINE
Payer: MEDICAID

## 2018-06-29 VITALS
SYSTOLIC BLOOD PRESSURE: 131 MMHG | WEIGHT: 167.99 LBS | HEIGHT: 68 IN | RESPIRATION RATE: 17 BRPM | OXYGEN SATURATION: 98 % | HEART RATE: 98 BPM | TEMPERATURE: 99 F | DIASTOLIC BLOOD PRESSURE: 83 MMHG

## 2018-06-29 VITALS
OXYGEN SATURATION: 99 % | SYSTOLIC BLOOD PRESSURE: 115 MMHG | HEART RATE: 75 BPM | TEMPERATURE: 98 F | DIASTOLIC BLOOD PRESSURE: 81 MMHG | RESPIRATION RATE: 16 BRPM

## 2018-06-29 DIAGNOSIS — Z98.890 OTHER SPECIFIED POSTPROCEDURAL STATES: Chronic | ICD-10-CM

## 2018-06-29 DIAGNOSIS — Z98.89 OTHER SPECIFIED POSTPROCEDURAL STATES: Chronic | ICD-10-CM

## 2018-06-29 LAB
ALBUMIN SERPL ELPH-MCNC: 4 G/DL — SIGNIFICANT CHANGE UP (ref 3.3–5)
ALP SERPL-CCNC: 62 U/L — SIGNIFICANT CHANGE UP (ref 40–120)
ALT FLD-CCNC: 27 U/L — SIGNIFICANT CHANGE UP (ref 10–45)
ANION GAP SERPL CALC-SCNC: 12 MMOL/L — SIGNIFICANT CHANGE UP (ref 5–17)
APPEARANCE UR: ABNORMAL
APTT BLD: 28.8 SEC — SIGNIFICANT CHANGE UP (ref 27.5–37.4)
AST SERPL-CCNC: 30 U/L — SIGNIFICANT CHANGE UP (ref 10–40)
BACTERIA # UR AUTO: ABNORMAL /HPF
BASE EXCESS BLDV CALC-SCNC: 4.5 MMOL/L — HIGH (ref -2–2)
BASOPHILS # BLD AUTO: 0 K/UL — SIGNIFICANT CHANGE UP (ref 0–0.2)
BASOPHILS NFR BLD AUTO: 0.2 % — SIGNIFICANT CHANGE UP (ref 0–2)
BILIRUB SERPL-MCNC: 0.5 MG/DL — SIGNIFICANT CHANGE UP (ref 0.2–1.2)
BILIRUB UR-MCNC: NEGATIVE — SIGNIFICANT CHANGE UP
BUN SERPL-MCNC: 19 MG/DL — SIGNIFICANT CHANGE UP (ref 7–23)
CA-I SERPL-SCNC: 1.17 MMOL/L — SIGNIFICANT CHANGE UP (ref 1.12–1.3)
CALCIUM SERPL-MCNC: 9.6 MG/DL — SIGNIFICANT CHANGE UP (ref 8.4–10.5)
CHLORIDE BLDV-SCNC: 105 MMOL/L — SIGNIFICANT CHANGE UP (ref 96–108)
CHLORIDE SERPL-SCNC: 100 MMOL/L — SIGNIFICANT CHANGE UP (ref 96–108)
CO2 BLDV-SCNC: 34 MMOL/L — HIGH (ref 22–30)
CO2 SERPL-SCNC: 28 MMOL/L — SIGNIFICANT CHANGE UP (ref 22–31)
COLOR SPEC: YELLOW — SIGNIFICANT CHANGE UP
CREAT SERPL-MCNC: 1.01 MG/DL — SIGNIFICANT CHANGE UP (ref 0.5–1.3)
DIFF PNL FLD: ABNORMAL
EOSINOPHIL # BLD AUTO: 0 K/UL — SIGNIFICANT CHANGE UP (ref 0–0.5)
EOSINOPHIL NFR BLD AUTO: 0.7 % — SIGNIFICANT CHANGE UP (ref 0–6)
GAS PNL BLDV: 136 MMOL/L — SIGNIFICANT CHANGE UP (ref 136–145)
GAS PNL BLDV: SIGNIFICANT CHANGE UP
GAS PNL BLDV: SIGNIFICANT CHANGE UP
GLUCOSE BLDV-MCNC: 100 MG/DL — HIGH (ref 70–99)
GLUCOSE SERPL-MCNC: 109 MG/DL — HIGH (ref 70–99)
GLUCOSE UR QL: NEGATIVE — SIGNIFICANT CHANGE UP
HCO3 BLDV-SCNC: 32 MMOL/L — HIGH (ref 21–29)
HCT VFR BLD CALC: 44.2 % — SIGNIFICANT CHANGE UP (ref 34.5–45)
HCT VFR BLDA CALC: 44 % — SIGNIFICANT CHANGE UP (ref 39–50)
HGB BLD CALC-MCNC: 14.3 G/DL — SIGNIFICANT CHANGE UP (ref 11.5–15.5)
HGB BLD-MCNC: 14.3 G/DL — SIGNIFICANT CHANGE UP (ref 11.5–15.5)
INR BLD: 1.11 RATIO — SIGNIFICANT CHANGE UP (ref 0.88–1.16)
KETONES UR-MCNC: NEGATIVE — SIGNIFICANT CHANGE UP
LACTATE BLDV-MCNC: 1 MMOL/L — SIGNIFICANT CHANGE UP (ref 0.7–2)
LEUKOCYTE ESTERASE UR-ACNC: ABNORMAL
LYMPHOCYTES # BLD AUTO: 0.7 K/UL — LOW (ref 1–3.3)
LYMPHOCYTES # BLD AUTO: 11.2 % — LOW (ref 13–44)
MCHC RBC-ENTMCNC: 31.2 PG — SIGNIFICANT CHANGE UP (ref 27–34)
MCHC RBC-ENTMCNC: 32.3 GM/DL — SIGNIFICANT CHANGE UP (ref 32–36)
MCV RBC AUTO: 96.6 FL — SIGNIFICANT CHANGE UP (ref 80–100)
MONOCYTES # BLD AUTO: 0.5 K/UL — SIGNIFICANT CHANGE UP (ref 0–0.9)
MONOCYTES NFR BLD AUTO: 8.4 % — SIGNIFICANT CHANGE UP (ref 2–14)
NEUTROPHILS # BLD AUTO: 5.2 K/UL — SIGNIFICANT CHANGE UP (ref 1.8–7.4)
NEUTROPHILS NFR BLD AUTO: 79.5 % — HIGH (ref 43–77)
NITRITE UR-MCNC: POSITIVE
PCO2 BLDV: 60 MMHG — HIGH (ref 35–50)
PH BLDV: 7.34 — LOW (ref 7.35–7.45)
PH UR: 6 — SIGNIFICANT CHANGE UP (ref 5–8)
PLATELET # BLD AUTO: 188 K/UL — SIGNIFICANT CHANGE UP (ref 150–400)
PO2 BLDV: 17 MMHG — LOW (ref 25–45)
POTASSIUM BLDV-SCNC: 3.4 MMOL/L — LOW (ref 3.5–5.3)
POTASSIUM SERPL-MCNC: 4 MMOL/L — SIGNIFICANT CHANGE UP (ref 3.5–5.3)
POTASSIUM SERPL-SCNC: 4 MMOL/L — SIGNIFICANT CHANGE UP (ref 3.5–5.3)
PROT SERPL-MCNC: 7.7 G/DL — SIGNIFICANT CHANGE UP (ref 6–8.3)
PROT UR-MCNC: 100 MG/DL
PROTHROM AB SERPL-ACNC: 12 SEC — SIGNIFICANT CHANGE UP (ref 9.8–12.7)
RAPID RVP RESULT: DETECTED
RBC # BLD: 4.57 M/UL — SIGNIFICANT CHANGE UP (ref 3.8–5.2)
RBC # FLD: 11.6 % — SIGNIFICANT CHANGE UP (ref 10.3–14.5)
RBC CASTS # UR COMP ASSIST: ABNORMAL /HPF (ref 0–2)
RV+EV RNA SPEC QL NAA+PROBE: DETECTED
SAO2 % BLDV: 19 % — LOW (ref 67–88)
SODIUM SERPL-SCNC: 140 MMOL/L — SIGNIFICANT CHANGE UP (ref 135–145)
SP GR SPEC: 1.02 — SIGNIFICANT CHANGE UP (ref 1.01–1.02)
UROBILINOGEN FLD QL: NEGATIVE — SIGNIFICANT CHANGE UP
WBC # BLD: 6.6 K/UL — SIGNIFICANT CHANGE UP (ref 3.8–10.5)
WBC # FLD AUTO: 6.6 K/UL — SIGNIFICANT CHANGE UP (ref 3.8–10.5)
WBC UR QL: >50 /HPF (ref 0–5)

## 2018-06-29 PROCEDURE — 87798 DETECT AGENT NOS DNA AMP: CPT

## 2018-06-29 PROCEDURE — 87186 SC STD MICRODIL/AGAR DIL: CPT

## 2018-06-29 PROCEDURE — 87086 URINE CULTURE/COLONY COUNT: CPT

## 2018-06-29 PROCEDURE — 84132 ASSAY OF SERUM POTASSIUM: CPT

## 2018-06-29 PROCEDURE — 87581 M.PNEUMON DNA AMP PROBE: CPT

## 2018-06-29 PROCEDURE — 99284 EMERGENCY DEPT VISIT MOD MDM: CPT | Mod: 25

## 2018-06-29 PROCEDURE — 71046 X-RAY EXAM CHEST 2 VIEWS: CPT

## 2018-06-29 PROCEDURE — 83605 ASSAY OF LACTIC ACID: CPT

## 2018-06-29 PROCEDURE — 71046 X-RAY EXAM CHEST 2 VIEWS: CPT | Mod: 26

## 2018-06-29 PROCEDURE — 80053 COMPREHEN METABOLIC PANEL: CPT

## 2018-06-29 PROCEDURE — 81001 URINALYSIS AUTO W/SCOPE: CPT

## 2018-06-29 PROCEDURE — 82803 BLOOD GASES ANY COMBINATION: CPT

## 2018-06-29 PROCEDURE — 87633 RESP VIRUS 12-25 TARGETS: CPT

## 2018-06-29 PROCEDURE — 82947 ASSAY GLUCOSE BLOOD QUANT: CPT

## 2018-06-29 PROCEDURE — 87486 CHLMYD PNEUM DNA AMP PROBE: CPT

## 2018-06-29 PROCEDURE — 82435 ASSAY OF BLOOD CHLORIDE: CPT

## 2018-06-29 PROCEDURE — 85014 HEMATOCRIT: CPT

## 2018-06-29 PROCEDURE — 85610 PROTHROMBIN TIME: CPT

## 2018-06-29 PROCEDURE — 96374 THER/PROPH/DIAG INJ IV PUSH: CPT

## 2018-06-29 PROCEDURE — 93005 ELECTROCARDIOGRAM TRACING: CPT

## 2018-06-29 PROCEDURE — 84295 ASSAY OF SERUM SODIUM: CPT

## 2018-06-29 PROCEDURE — 85027 COMPLETE CBC AUTOMATED: CPT

## 2018-06-29 PROCEDURE — 82330 ASSAY OF CALCIUM: CPT

## 2018-06-29 PROCEDURE — 93010 ELECTROCARDIOGRAM REPORT: CPT

## 2018-06-29 PROCEDURE — 85730 THROMBOPLASTIN TIME PARTIAL: CPT

## 2018-06-29 RX ORDER — CEFUROXIME AXETIL 250 MG
1 TABLET ORAL
Qty: 14 | Refills: 0 | OUTPATIENT
Start: 2018-06-29 | End: 2018-07-05

## 2018-06-29 RX ORDER — CEFTRIAXONE 500 MG/1
1 INJECTION, POWDER, FOR SOLUTION INTRAMUSCULAR; INTRAVENOUS ONCE
Qty: 0 | Refills: 0 | Status: COMPLETED | OUTPATIENT
Start: 2018-06-29 | End: 2018-06-29

## 2018-06-29 RX ORDER — SODIUM CHLORIDE 9 MG/ML
1000 INJECTION INTRAMUSCULAR; INTRAVENOUS; SUBCUTANEOUS ONCE
Qty: 0 | Refills: 0 | Status: COMPLETED | OUTPATIENT
Start: 2018-06-29 | End: 2018-06-29

## 2018-06-29 RX ORDER — SODIUM CHLORIDE 9 MG/ML
3 INJECTION INTRAMUSCULAR; INTRAVENOUS; SUBCUTANEOUS ONCE
Qty: 0 | Refills: 0 | Status: COMPLETED | OUTPATIENT
Start: 2018-06-29 | End: 2018-06-29

## 2018-06-29 RX ADMIN — CEFTRIAXONE 100 GRAM(S): 500 INJECTION, POWDER, FOR SOLUTION INTRAMUSCULAR; INTRAVENOUS at 18:27

## 2018-06-29 RX ADMIN — SODIUM CHLORIDE 1000 MILLILITER(S): 9 INJECTION INTRAMUSCULAR; INTRAVENOUS; SUBCUTANEOUS at 16:11

## 2018-06-29 RX ADMIN — SODIUM CHLORIDE 3 MILLILITER(S): 9 INJECTION INTRAMUSCULAR; INTRAVENOUS; SUBCUTANEOUS at 16:11

## 2018-06-29 NOTE — ED ADULT NURSE NOTE - OBJECTIVE STATEMENT
62 year old female A&OX4 denies past medical hx, presents with hematuria and dysuria x several days. Patient states that she has been noticing increased pain upon urination and as of yesterday hematuria. Patient also reports bloating and abdominal pain over the last week. Patient abdomen is distended and tender over bilateral lower quadrants. Lung sounds are rhonchi bilaterally. Patient denies nausea, vomiting, dizziness, weakness, fevers, chills, chest pain, shortness of breath, palpitations, headache.

## 2018-06-29 NOTE — ED PROVIDER NOTE - MEDICAL DECISION MAKING DETAILS
cough ronchi ro pna/uri check labs ua, xray, dysuria hematuria ro uti check ua ua culture  Jeremy Jean MD, Facep

## 2018-06-29 NOTE — ED PROVIDER NOTE - ATTENDING CONTRIBUTION TO CARE
Private Physician Johnny Guadarrama PCP,  62y female pmh Breast ca sp lumpectomy rt with rt/chemo. 15y ago, no habits,dm,htn,hld,other cancer, Retired . Pt also complains of uri past several days. Now complains of pelvic discomfort, uregency, urinary frequency, dysuria with gross hematuria, No fever chills. no shortness of breath cp, Mild cough with scant to no sputum, no hemoptysis, PE WDWN female awake alert normocephalic atraumatic chest coarse ronchi darrian no use accessory muscles. cv no rubs, gallops or murmurs neuro no focal defects. Abdsfot +bs no mass guarding. ttp,  Jeremy Jean MD, Facep

## 2018-06-29 NOTE — ED PROVIDER NOTE - NS ED ROS FT
ROS: denies HA, weakness, dizziness, fevers/chills, nausea/vomiting, chest pain, diaphoresis, back/neck pain, or rash  +ab pain, dysuria, hematuria, cough, SOB

## 2018-06-29 NOTE — ED PROVIDER NOTE - PSH
History of facial surgery  jaw correction as teenager age 19  History of lumpectomy  Right- 2004  S/P bunionectomy  left

## 2018-06-29 NOTE — ED PROVIDER NOTE - OBJECTIVE STATEMENT
62 y.o. female hx of breast CA sp lumpectomy and radiating 14 years ago, pw lower ab pain and cramping with increased frequency of urination with dysuria x 2 days, worse today. Today also with hematuria. No back pain, nausea or vomiting. Also endorses sob and cough for past few days. No fevers. No chest pain.

## 2018-07-01 ENCOUNTER — OUTPATIENT (OUTPATIENT)
Dept: OUTPATIENT SERVICES | Facility: HOSPITAL | Age: 63
LOS: 1 days | End: 2018-07-01
Payer: MEDICAID

## 2018-07-01 DIAGNOSIS — Z98.890 OTHER SPECIFIED POSTPROCEDURAL STATES: Chronic | ICD-10-CM

## 2018-07-01 DIAGNOSIS — Z98.89 OTHER SPECIFIED POSTPROCEDURAL STATES: Chronic | ICD-10-CM

## 2018-07-01 LAB
-  AMIKACIN: SIGNIFICANT CHANGE UP
-  AMIKACIN: SIGNIFICANT CHANGE UP
-  AMOXICILLIN/CLAVULANIC ACID: SIGNIFICANT CHANGE UP
-  AMOXICILLIN/CLAVULANIC ACID: SIGNIFICANT CHANGE UP
-  AMPICILLIN/SULBACTAM: SIGNIFICANT CHANGE UP
-  AMPICILLIN/SULBACTAM: SIGNIFICANT CHANGE UP
-  AMPICILLIN: SIGNIFICANT CHANGE UP
-  AMPICILLIN: SIGNIFICANT CHANGE UP
-  AZTREONAM: SIGNIFICANT CHANGE UP
-  AZTREONAM: SIGNIFICANT CHANGE UP
-  CEFAZOLIN: SIGNIFICANT CHANGE UP
-  CEFAZOLIN: SIGNIFICANT CHANGE UP
-  CEFEPIME: SIGNIFICANT CHANGE UP
-  CEFEPIME: SIGNIFICANT CHANGE UP
-  CEFOXITIN: SIGNIFICANT CHANGE UP
-  CEFOXITIN: SIGNIFICANT CHANGE UP
-  CEFTRIAXONE: SIGNIFICANT CHANGE UP
-  CEFTRIAXONE: SIGNIFICANT CHANGE UP
-  CIPROFLOXACIN: SIGNIFICANT CHANGE UP
-  CIPROFLOXACIN: SIGNIFICANT CHANGE UP
-  ERTAPENEM: SIGNIFICANT CHANGE UP
-  ERTAPENEM: SIGNIFICANT CHANGE UP
-  GENTAMICIN: SIGNIFICANT CHANGE UP
-  GENTAMICIN: SIGNIFICANT CHANGE UP
-  IMIPENEM: SIGNIFICANT CHANGE UP
-  LEVOFLOXACIN: SIGNIFICANT CHANGE UP
-  LEVOFLOXACIN: SIGNIFICANT CHANGE UP
-  MEROPENEM: SIGNIFICANT CHANGE UP
-  MEROPENEM: SIGNIFICANT CHANGE UP
-  NITROFURANTOIN: SIGNIFICANT CHANGE UP
-  NITROFURANTOIN: SIGNIFICANT CHANGE UP
-  PIPERACILLIN/TAZOBACTAM: SIGNIFICANT CHANGE UP
-  PIPERACILLIN/TAZOBACTAM: SIGNIFICANT CHANGE UP
-  TIGECYCLINE: SIGNIFICANT CHANGE UP
-  TOBRAMYCIN: SIGNIFICANT CHANGE UP
-  TOBRAMYCIN: SIGNIFICANT CHANGE UP
-  TRIMETHOPRIM/SULFAMETHOXAZOLE: SIGNIFICANT CHANGE UP
-  TRIMETHOPRIM/SULFAMETHOXAZOLE: SIGNIFICANT CHANGE UP
CULTURE RESULTS: SIGNIFICANT CHANGE UP
METHOD TYPE: SIGNIFICANT CHANGE UP
METHOD TYPE: SIGNIFICANT CHANGE UP
ORGANISM # SPEC MICROSCOPIC CNT: SIGNIFICANT CHANGE UP
SPECIMEN SOURCE: SIGNIFICANT CHANGE UP

## 2018-07-01 PROCEDURE — G9001: CPT

## 2018-07-01 NOTE — ED POST DISCHARGE NOTE - DETAILS
Juan A Joseph PA-C, 7/1/18, 2:55pm: Left VM for pt to call back for UC results. If pt is asymptomatic, no need for change in antibiotic. spoke with patient, feeling improved, advised finish abx and recheck with pmd - Marlyn Mo PA-C

## 2018-07-10 DIAGNOSIS — Z71.89 OTHER SPECIFIED COUNSELING: ICD-10-CM

## 2018-07-12 ENCOUNTER — APPOINTMENT (OUTPATIENT)
Dept: INTERNAL MEDICINE | Facility: CLINIC | Age: 63
End: 2018-07-12
Payer: MEDICAID

## 2018-07-12 VITALS
BODY MASS INDEX: 28.12 KG/M2 | OXYGEN SATURATION: 98 % | WEIGHT: 175 LBS | TEMPERATURE: 98.7 F | HEART RATE: 74 BPM | HEIGHT: 66.25 IN

## 2018-07-12 LAB
BILIRUB UR QL STRIP: NORMAL
GLUCOSE UR-MCNC: NORMAL
HCG UR QL: 0.2 EU/DL
HGB UR QL STRIP.AUTO: NORMAL
KETONES UR-MCNC: NORMAL
LEUKOCYTE ESTERASE UR QL STRIP: NORMAL
NITRITE UR QL STRIP: NORMAL
PH UR STRIP: 6
PROT UR STRIP-MCNC: NORMAL
SP GR UR STRIP: 1.01

## 2018-07-12 PROCEDURE — 81003 URINALYSIS AUTO W/O SCOPE: CPT | Mod: QW

## 2018-07-12 PROCEDURE — 99214 OFFICE O/P EST MOD 30 MIN: CPT

## 2018-07-13 VITALS — SYSTOLIC BLOOD PRESSURE: 130 MMHG | DIASTOLIC BLOOD PRESSURE: 68 MMHG

## 2018-07-13 NOTE — PHYSICAL EXAM
[No Acute Distress] : no acute distress [Well Nourished] : well nourished [Well Developed] : well developed [Normal Outer Ear/Nose] : the outer ears and nose were normal in appearance [Normal Oropharynx] : the oropharynx was normal [No JVD] : no jugular venous distention [Supple] : supple [No Lymphadenopathy] : no lymphadenopathy [No Respiratory Distress] : no respiratory distress  [Clear to Auscultation] : lungs were clear to auscultation bilaterally [No Accessory Muscle Use] : no accessory muscle use [Normal Rate] : normal rate  [Regular Rhythm] : with a regular rhythm [Normal S1, S2] : normal S1 and S2 [Pedal Pulses Present] : the pedal pulses are present [No Edema] : there was no peripheral edema [Soft] : abdomen soft [Non Tender] : non-tender [No HSM] : no HSM [No CVA Tenderness] : no CVA  tenderness

## 2018-07-13 NOTE — HISTORY OF PRESENT ILLNESS
[de-identified] : The patient comes in to follow-up from her ER visit.  Two weeks ago she had dysuria and gross hematuria.  She went to the ER and was found to have a UTI and was treated with Ceftin for seven days.  She finished the antibiotics six days ago.  No urinary symptoms now.  She was advised to follow-up here.\par \par She also had URI symptoms at the time- nonproductive cough. No fever, dyspnea, chest pain, sinus or ear pain.  That is also better except for a mild residual cough.  She was found to have an entero/rhinovirus in the ER.  A CXR was negative.  \par \par She also mentions that she didn't have the TKR because there was an issue with a dental infection at the time

## 2018-07-13 NOTE — ASSESSMENT
[FreeTextEntry1] : The patient is s/p a UTI treated with Ceftin and she has no symptoms now.  Check a follow-up UCX. Previous UCX showed a Proteus and E.coli.    PO fluids advised.\par \par The URI symptoms appear to have been viral and are mostly better.  Call PRN.\par \par She is going to reschedule the TKR but no date yet.

## 2018-07-17 PROBLEM — N39.41 URGE INCONTINENCE: Chronic | Status: ACTIVE | Noted: 2018-03-28

## 2018-07-18 ENCOUNTER — OUTPATIENT (OUTPATIENT)
Dept: OUTPATIENT SERVICES | Facility: HOSPITAL | Age: 63
LOS: 1 days | End: 2018-07-18

## 2018-07-18 ENCOUNTER — APPOINTMENT (OUTPATIENT)
Dept: OBGYN | Facility: HOSPITAL | Age: 63
End: 2018-07-18
Payer: MEDICAID

## 2018-07-18 VITALS — SYSTOLIC BLOOD PRESSURE: 125 MMHG | HEART RATE: 95 BPM | DIASTOLIC BLOOD PRESSURE: 86 MMHG | HEIGHT: 66 IN

## 2018-07-18 DIAGNOSIS — Z98.89 OTHER SPECIFIED POSTPROCEDURAL STATES: Chronic | ICD-10-CM

## 2018-07-18 DIAGNOSIS — Z98.890 OTHER SPECIFIED POSTPROCEDURAL STATES: Chronic | ICD-10-CM

## 2018-07-18 PROCEDURE — 99213 OFFICE O/P EST LOW 20 MIN: CPT | Mod: GE

## 2018-07-19 DIAGNOSIS — Z01.419 ENCOUNTER FOR GYNECOLOGICAL EXAMINATION (GENERAL) (ROUTINE) WITHOUT ABNORMAL FINDINGS: ICD-10-CM

## 2018-07-19 DIAGNOSIS — R14.0 ABDOMINAL DISTENSION (GASEOUS): ICD-10-CM

## 2018-07-19 PROBLEM — M25.562 PAIN IN LEFT KNEE: Chronic | Status: ACTIVE | Noted: 2018-03-28

## 2018-07-19 PROBLEM — D72.819 DECREASED WHITE BLOOD CELL COUNT, UNSPECIFIED: Chronic | Status: ACTIVE | Noted: 2018-03-28

## 2018-07-19 LAB — BACTERIA UR CULT: ABNORMAL

## 2018-07-25 ENCOUNTER — APPOINTMENT (OUTPATIENT)
Dept: ORTHOPEDIC SURGERY | Facility: CLINIC | Age: 63
End: 2018-07-25
Payer: MEDICAID

## 2018-07-25 ENCOUNTER — APPOINTMENT (OUTPATIENT)
Dept: ULTRASOUND IMAGING | Facility: CLINIC | Age: 63
End: 2018-07-25
Payer: MEDICAID

## 2018-07-25 ENCOUNTER — OUTPATIENT (OUTPATIENT)
Dept: OUTPATIENT SERVICES | Facility: HOSPITAL | Age: 63
LOS: 1 days | End: 2018-07-25
Payer: MEDICAID

## 2018-07-25 DIAGNOSIS — Z00.8 ENCOUNTER FOR OTHER GENERAL EXAMINATION: ICD-10-CM

## 2018-07-25 DIAGNOSIS — Z98.89 OTHER SPECIFIED POSTPROCEDURAL STATES: Chronic | ICD-10-CM

## 2018-07-25 DIAGNOSIS — Z98.890 OTHER SPECIFIED POSTPROCEDURAL STATES: Chronic | ICD-10-CM

## 2018-07-25 PROCEDURE — 76830 TRANSVAGINAL US NON-OB: CPT | Mod: 26

## 2018-07-25 PROCEDURE — 76830 TRANSVAGINAL US NON-OB: CPT

## 2018-07-25 PROCEDURE — 99212 OFFICE O/P EST SF 10 MIN: CPT

## 2018-07-27 LAB
APPEARANCE: CLEAR
BACTERIA UR CULT: NORMAL
BACTERIA: NEGATIVE
BILIRUBIN URINE: NEGATIVE
BLOOD URINE: NEGATIVE
COLOR: YELLOW
GLUCOSE QUALITATIVE U: NEGATIVE MG/DL
HYALINE CASTS: 0 /LPF
KETONES URINE: NEGATIVE
LEUKOCYTE ESTERASE URINE: NEGATIVE
MICROSCOPIC-UA: NORMAL
NITRITE URINE: NEGATIVE
PH URINE: 5.5
PROTEIN URINE: NEGATIVE MG/DL
RED BLOOD CELLS URINE: 1 /HPF
SPECIFIC GRAVITY URINE: 1.02
SQUAMOUS EPITHELIAL CELLS: 0 /HPF
UROBILINOGEN URINE: NEGATIVE MG/DL
WHITE BLOOD CELLS URINE: 0 /HPF

## 2018-07-30 ENCOUNTER — APPOINTMENT (OUTPATIENT)
Dept: UROGYNECOLOGY | Facility: CLINIC | Age: 63
End: 2018-07-30
Payer: MEDICAID

## 2018-07-30 PROCEDURE — 64566 NEUROELTRD STIM POST TIBIAL: CPT

## 2019-01-07 ENCOUNTER — APPOINTMENT (OUTPATIENT)
Dept: INTERNAL MEDICINE | Facility: HOSPITAL | Age: 64
End: 2019-01-07
Payer: COMMERCIAL

## 2019-01-07 ENCOUNTER — MED ADMIN CHARGE (OUTPATIENT)
Age: 64
End: 2019-01-07

## 2019-01-07 ENCOUNTER — OUTPATIENT (OUTPATIENT)
Dept: OUTPATIENT SERVICES | Facility: HOSPITAL | Age: 64
LOS: 1 days | End: 2019-01-07

## 2019-01-07 VITALS — HEART RATE: 70 BPM | DIASTOLIC BLOOD PRESSURE: 70 MMHG | SYSTOLIC BLOOD PRESSURE: 126 MMHG

## 2019-01-07 VITALS — HEIGHT: 66 IN | BODY MASS INDEX: 27.71 KG/M2 | WEIGHT: 172.44 LBS

## 2019-01-07 DIAGNOSIS — N61.0 MASTITIS WITHOUT ABSCESS: ICD-10-CM

## 2019-01-07 DIAGNOSIS — M17.0 BILATERAL PRIMARY OSTEOARTHRITIS OF KNEE: ICD-10-CM

## 2019-01-07 DIAGNOSIS — Z87.898 PERSONAL HISTORY OF OTHER SPECIFIED CONDITIONS: ICD-10-CM

## 2019-01-07 DIAGNOSIS — L53.9 ERYTHEMATOUS CONDITION, UNSPECIFIED: ICD-10-CM

## 2019-01-07 DIAGNOSIS — L72.0 EPIDERMAL CYST: ICD-10-CM

## 2019-01-07 DIAGNOSIS — M17.12 UNILATERAL PRIMARY OSTEOARTHRITIS, LEFT KNEE: ICD-10-CM

## 2019-01-07 DIAGNOSIS — R14.0 ABDOMINAL DISTENSION (GASEOUS): ICD-10-CM

## 2019-01-07 DIAGNOSIS — Z88.9 ALLERGY STATUS TO UNSPECIFIED DRUGS, MEDICAMENTS AND BIOLOGICAL SUBSTANCES: ICD-10-CM

## 2019-01-07 DIAGNOSIS — N39.0 URINARY TRACT INFECTION, SITE NOT SPECIFIED: ICD-10-CM

## 2019-01-07 DIAGNOSIS — M79.605 PAIN IN RIGHT LEG: ICD-10-CM

## 2019-01-07 DIAGNOSIS — J30.2 OTHER SEASONAL ALLERGIC RHINITIS: ICD-10-CM

## 2019-01-07 DIAGNOSIS — M21.611 BUNION OF RIGHT FOOT: ICD-10-CM

## 2019-01-07 DIAGNOSIS — M79.673 PAIN IN UNSPECIFIED FOOT: ICD-10-CM

## 2019-01-07 DIAGNOSIS — D72.819 DECREASED WHITE BLOOD CELL COUNT, UNSPECIFIED: ICD-10-CM

## 2019-01-07 DIAGNOSIS — M79.604 PAIN IN RIGHT LEG: ICD-10-CM

## 2019-01-07 DIAGNOSIS — Z98.890 OTHER SPECIFIED POSTPROCEDURAL STATES: Chronic | ICD-10-CM

## 2019-01-07 DIAGNOSIS — Z01.818 ENCOUNTER FOR OTHER PREPROCEDURAL EXAMINATION: ICD-10-CM

## 2019-01-07 DIAGNOSIS — Z98.89 OTHER SPECIFIED POSTPROCEDURAL STATES: Chronic | ICD-10-CM

## 2019-01-07 DIAGNOSIS — R31.29 OTHER MICROSCOPIC HEMATURIA: ICD-10-CM

## 2019-01-07 DIAGNOSIS — E55.9 VITAMIN D DEFICIENCY, UNSPECIFIED: ICD-10-CM

## 2019-01-07 PROCEDURE — 99214 OFFICE O/P EST MOD 30 MIN: CPT | Mod: GC

## 2019-01-07 RX ORDER — FLUTICASONE PROPIONATE 50 UG/1
50 SPRAY, METERED NASAL TWICE DAILY
Qty: 1 | Refills: 3 | Status: COMPLETED | COMMUNITY
Start: 2017-10-17 | End: 2019-01-07

## 2019-01-07 RX ORDER — NITROFURANTOIN (MONOHYDRATE/MACROCRYSTALS) 25; 75 MG/1; MG/1
100 CAPSULE ORAL TWICE DAILY
Qty: 14 | Refills: 0 | Status: COMPLETED | COMMUNITY
Start: 2018-07-18 | End: 2019-01-07

## 2019-01-07 RX ORDER — MELOXICAM 15 MG/1
15 TABLET ORAL DAILY
Qty: 30 | Refills: 2 | Status: COMPLETED | COMMUNITY
Start: 2017-11-20 | End: 2019-01-07

## 2019-01-07 RX ORDER — OXYBUTYNIN CHLORIDE 5 MG/1
5 TABLET, EXTENDED RELEASE ORAL
Qty: 90 | Refills: 0 | Status: COMPLETED | COMMUNITY
Start: 2018-07-30 | End: 2019-01-07

## 2019-01-08 DIAGNOSIS — L72.0 EPIDERMAL CYST: ICD-10-CM

## 2019-01-08 DIAGNOSIS — M17.10 UNILATERAL PRIMARY OSTEOARTHRITIS, UNSPECIFIED KNEE: ICD-10-CM

## 2019-01-08 DIAGNOSIS — F32.9 MAJOR DEPRESSIVE DISORDER, SINGLE EPISODE, UNSPECIFIED: ICD-10-CM

## 2019-01-08 DIAGNOSIS — Z00.00 ENCOUNTER FOR GENERAL ADULT MEDICAL EXAMINATION WITHOUT ABNORMAL FINDINGS: ICD-10-CM

## 2019-01-08 NOTE — HISTORY OF PRESENT ILLNESS
[FreeTextEntry1] : Re-Establish care  [de-identified] : Patient is a 63 year old female with L knee osteoarthritis, Depression, and history of L breast cancer s/p lumpectomy and radiation.  \par \par L knee osteoarthritis: Pt reports pain on movement and has limited her walking.  Pt was followed by orthopedic surgeon in the past.  had undergone physical therapy and was offered surgery but in the summer there was improvement in the L knee pain so surgery was deferred.  Patient currently takes meloxicam once a week, advil 200mg 5x/week, osteo bi-flex.  \par \par Depression: Pt reports she has had baseline depression since a young age and becomes worse at times from life circumstances.  She denies ever taking medications but has seen a psychiatrist in the past.  She denies any changes in appetite or activity, denies feelings of hopelessness, or suicidal ideations.  Pt reports improvement of mood with exercise.\par \par Cyst: Pt reports that 2 weeks ago a cyst on chest started growing.  This is the second time that has happened.  A few years ago, patient went to the ED and had an I&D.  Patient denies any pain or other cysts in any other parts of the body.  \par \par Hx of breast cancer: Pt follows up with oncology every year, Dr. Asher.  Pt is due for mammogram/US in late January.\par \par Patient denies any recent fevers, infections, travel.  Patient would like to obtain her flu shot.

## 2019-01-08 NOTE — PHYSICAL EXAM
[No Acute Distress] : no acute distress [Well Nourished] : well nourished [Well Developed] : well developed [Normal Sclera/Conjunctiva] : normal sclera/conjunctiva [PERRL] : pupils equal round and reactive to light [EOMI] : extraocular movements intact [Normal Outer Ear/Nose] : the outer ears and nose were normal in appearance [Normal Oropharynx] : the oropharynx was normal [Supple] : supple [No Lymphadenopathy] : no lymphadenopathy [Thyroid Normal, No Nodules] : the thyroid was normal and there were no nodules present [No Respiratory Distress] : no respiratory distress  [Clear to Auscultation] : lungs were clear to auscultation bilaterally [No Accessory Muscle Use] : no accessory muscle use [Normal Rate] : normal rate  [Regular Rhythm] : with a regular rhythm [Normal S1, S2] : normal S1 and S2 [No Murmur] : no murmur heard [No Edema] : there was no peripheral edema [Soft] : abdomen soft [Non Tender] : non-tender [Non-distended] : non-distended [Normal Bowel Sounds] : normal bowel sounds [No Joint Swelling] : no joint swelling [Grossly Normal Strength/Tone] : grossly normal strength/tone [Normal Insight/Judgement] : insight and judgment were intact [de-identified] : 2 cm cyst in mid-chest with fluctuence, erythematous

## 2019-01-08 NOTE — ASSESSMENT
[FreeTextEntry1] : Patient is a 63 year old female with L knee osteoarthritis, depression, and hx of Breast cancer s/p radiation and lumpectomy presenting to [facility] care.

## 2019-01-08 NOTE — END OF VISIT
[] : Resident [FreeTextEntry3] : Abscess between breast - superficial-present for 2 weeks, 2nd episode - warm zelalem sand derm vs surgery for I+D

## 2019-01-09 DIAGNOSIS — Z23 ENCOUNTER FOR IMMUNIZATION: ICD-10-CM

## 2019-01-30 ENCOUNTER — FORM ENCOUNTER (OUTPATIENT)
Age: 64
End: 2019-01-30

## 2019-01-31 ENCOUNTER — APPOINTMENT (OUTPATIENT)
Dept: MAMMOGRAPHY | Facility: IMAGING CENTER | Age: 64
End: 2019-01-31
Payer: COMMERCIAL

## 2019-01-31 ENCOUNTER — APPOINTMENT (OUTPATIENT)
Dept: ULTRASOUND IMAGING | Facility: IMAGING CENTER | Age: 64
End: 2019-01-31
Payer: COMMERCIAL

## 2019-01-31 ENCOUNTER — OUTPATIENT (OUTPATIENT)
Dept: OUTPATIENT SERVICES | Facility: HOSPITAL | Age: 64
LOS: 1 days | End: 2019-01-31
Payer: SELF-PAY

## 2019-01-31 ENCOUNTER — APPOINTMENT (OUTPATIENT)
Dept: RADIOLOGY | Facility: IMAGING CENTER | Age: 64
End: 2019-01-31
Payer: COMMERCIAL

## 2019-01-31 DIAGNOSIS — Z98.890 OTHER SPECIFIED POSTPROCEDURAL STATES: Chronic | ICD-10-CM

## 2019-01-31 DIAGNOSIS — C50.911 MALIGNANT NEOPLASM OF UNSPECIFIED SITE OF RIGHT FEMALE BREAST: ICD-10-CM

## 2019-01-31 DIAGNOSIS — Z98.89 OTHER SPECIFIED POSTPROCEDURAL STATES: Chronic | ICD-10-CM

## 2019-01-31 PROCEDURE — 77080 DXA BONE DENSITY AXIAL: CPT | Mod: 26

## 2019-01-31 PROCEDURE — 77067 SCR MAMMO BI INCL CAD: CPT | Mod: 26

## 2019-01-31 PROCEDURE — 77063 BREAST TOMOSYNTHESIS BI: CPT

## 2019-01-31 PROCEDURE — 76641 ULTRASOUND BREAST COMPLETE: CPT | Mod: 26,50

## 2019-01-31 PROCEDURE — 77063 BREAST TOMOSYNTHESIS BI: CPT | Mod: 26

## 2019-01-31 PROCEDURE — 76641 ULTRASOUND BREAST COMPLETE: CPT

## 2019-01-31 PROCEDURE — 77067 SCR MAMMO BI INCL CAD: CPT

## 2019-01-31 PROCEDURE — 77080 DXA BONE DENSITY AXIAL: CPT

## 2019-02-01 ENCOUNTER — APPOINTMENT (OUTPATIENT)
Dept: DERMATOLOGY | Facility: HOSPITAL | Age: 64
End: 2019-02-01

## 2019-02-06 ENCOUNTER — OUTPATIENT (OUTPATIENT)
Dept: OUTPATIENT SERVICES | Facility: HOSPITAL | Age: 64
LOS: 1 days | Discharge: HOME | End: 2019-02-06

## 2019-02-06 DIAGNOSIS — C50.919 MALIGNANT NEOPLASM OF UNSPECIFIED SITE OF UNSPECIFIED FEMALE BREAST: ICD-10-CM

## 2019-02-06 DIAGNOSIS — Z98.890 OTHER SPECIFIED POSTPROCEDURAL STATES: Chronic | ICD-10-CM

## 2019-02-06 DIAGNOSIS — Z98.89 OTHER SPECIFIED POSTPROCEDURAL STATES: Chronic | ICD-10-CM

## 2019-02-13 ENCOUNTER — APPOINTMENT (OUTPATIENT)
Dept: HEMATOLOGY ONCOLOGY | Facility: CLINIC | Age: 64
End: 2019-02-13
Payer: COMMERCIAL

## 2019-02-13 VITALS
TEMPERATURE: 98.3 F | HEART RATE: 79 BPM | BODY MASS INDEX: 27.93 KG/M2 | RESPIRATION RATE: 16 BRPM | DIASTOLIC BLOOD PRESSURE: 73 MMHG | OXYGEN SATURATION: 100 % | WEIGHT: 173.06 LBS | SYSTOLIC BLOOD PRESSURE: 128 MMHG

## 2019-02-13 DIAGNOSIS — R92.2 INCONCLUSIVE MAMMOGRAM: ICD-10-CM

## 2019-02-13 PROCEDURE — 99213 OFFICE O/P EST LOW 20 MIN: CPT

## 2019-02-13 RX ORDER — MULTIVIT-MIN/VIT C/HERB NO.124 250-11.66
TABLET,CHEWABLE ORAL
Refills: 0 | Status: DISCONTINUED | COMMUNITY
End: 2019-02-13

## 2019-02-13 RX ORDER — OMEGA-3/DHA/EPA/FISH OIL 1200 MG
1200 CAPSULE ORAL
Refills: 0 | Status: ACTIVE | COMMUNITY
Start: 2019-02-13

## 2019-02-13 NOTE — HISTORY OF PRESENT ILLNESS
[Disease: _____________________] : Disease: [unfilled] [T: ___] : T[unfilled] [N: ___] : N[unfilled] [M: ___] : M[unfilled] [AJCC Stage: ____] : AJCC Stage: [unfilled] [Treatment Protocol] : Treatment Protocol [de-identified] : The patient presented in 2003, at age 47, with an abnormal screening mammogram of the right breast. \par  \par Excisional biopsy performed by Dr. Georges Rodriges at [facility] on November 3, 2003 demonstrated a 1.1 cm well-differentiated infiltrating ductal carcinoma which was ER positive (90%), NC positive (90%) and HER-2/jones negative. S-phase was low at 0.4%. \par  \par The pathology slides was subsequently reviewed at [facility] and was felt to demonstrate infiltrating lobular carcinoma. \par  \par Dr. Cameron Naik performed a sentinel lymph node biopsy on December 5, 2003 which demonstrated negative sentinel lymph nodes. A contralateral breast biopsy on the same date demonstrated LCIS. \par  \par The patient received radiation therapy at [facility] under the supervision of Dr. Caty Escobedo. \par  \par The patient subsequently took tamoxifen from April 2004 through October 2006 and anastrozole from October 2006 until October 2011. \par  \par  [de-identified] : Infiltrating lobular carcinoma ER positive OH positive HER-2/jones negative low S-phase [FreeTextEntry1] : On observation [de-identified] : The patient has been 15 years 2 months  since breast cancer diagnosis.\par \par The patient completed   2 years 6 months Tamoxifen 12 years 2 months.\par \par The patient completed 5 years of Arimidex 7  years 2 months ago.\par \par Last saw her breast surgeon 10 years ago for f/u  CBE, benign findings.\par \par Bilateral Mammogram/breast US 1/31/2019: breasts heterogeneously dense, Stable, BIRADS 2.\par 12/11/2017 - BILATERAL BREAST MAMMOGRAM - Heterogeneously dense breasts, no mammographic evidence of malignancy, BI RADS 2.\par \par \par Saw PCP Dr Johnny Guadarrama 7/27/2018 yearly physical exam,   benign finding. \par \par Had URI for 6 weeks, self medicated with cough medicine, improving.\par \par Ongoing chronic left knee pain with recent exacerbation, was scheduled for surgery which was cancelled for dental  infection\par \par Was seen in ER for UTI summer 2018, presented with hematuria.\par \par No new medical diagnosis nor surgery since last visit. \par \par \par \par

## 2019-02-13 NOTE — REVIEW OF SYSTEMS
[Depression] : depression [Negative] : Allergic/Immunologic [Insomnia] : no insomnia [FreeTextEntry2] : Report energy level slightly low. S/P flu vaccination 1/2019 [FreeTextEntry7] : Last colonoscopy, 03/04/2015 which demonstrated normal colon [FreeTextEntry8] : Last GYN exam 7/18/2018.. Denies vaginal spotting/bleeding.Hematuria x1, see interval history. [FreeTextEntry9] : Most recent BMD 1/31/2019, still normal although femoral neck T score declined 10%. See interval history. [de-identified] : Still feels her memory is poor.

## 2019-02-13 NOTE — PHYSICAL EXAM
[Fully active, able to carry on all pre-disease performance without restriction] : Status 0 - Fully active, able to carry on all pre-disease performance without restriction [Normal] : affect appropriate [de-identified] : Asymmetry breasts : Right breast smaller than left. Right breast  scars UOQ and axilla, , deformity from surgery lateral breast.with no discrete palpable mass. Left  Breast: scar UOQ, no mass.

## 2019-06-24 NOTE — H&P PST ADULT - FUNCTIONAL LEVEL PRIOR: EATING
Medication(s) Requested: clonazePAM (KLONOPIN) 0.5 MG tablet  Last office visit: 3/6/2019  Last refill: 4/29/2019  Is the patient due for refill of this medication(s): Yes  PDMP review: Criteria met. Forwarded to Physician/KEN for signature.    Patient has scheduled appointment 6/26/2019 with Dr. Vernon.      (0) independent

## 2019-09-30 ENCOUNTER — OTHER (OUTPATIENT)
Age: 64
End: 2019-09-30

## 2019-09-30 ENCOUNTER — LABORATORY RESULT (OUTPATIENT)
Age: 64
End: 2019-09-30

## 2019-09-30 ENCOUNTER — APPOINTMENT (OUTPATIENT)
Dept: INTERNAL MEDICINE | Facility: CLINIC | Age: 64
End: 2019-09-30

## 2019-09-30 ENCOUNTER — MED ADMIN CHARGE (OUTPATIENT)
Age: 64
End: 2019-09-30

## 2019-09-30 ENCOUNTER — OUTPATIENT (OUTPATIENT)
Dept: OUTPATIENT SERVICES | Facility: HOSPITAL | Age: 64
LOS: 1 days | End: 2019-09-30

## 2019-09-30 VITALS
BODY MASS INDEX: 25.46 KG/M2 | WEIGHT: 168 LBS | SYSTOLIC BLOOD PRESSURE: 110 MMHG | HEART RATE: 62 BPM | HEIGHT: 68 IN | DIASTOLIC BLOOD PRESSURE: 70 MMHG | OXYGEN SATURATION: 98 %

## 2019-09-30 DIAGNOSIS — Z98.890 OTHER SPECIFIED POSTPROCEDURAL STATES: Chronic | ICD-10-CM

## 2019-09-30 DIAGNOSIS — Z01.419 ENCOUNTER FOR GYNECOLOGICAL EXAMINATION (GENERAL) (ROUTINE) W/OUT ABNORMAL FINDINGS: ICD-10-CM

## 2019-09-30 DIAGNOSIS — C50.911 MALIGNANT NEOPLASM OF UNSPECIFIED SITE OF RIGHT FEMALE BREAST: ICD-10-CM

## 2019-09-30 DIAGNOSIS — N32.81 OVERACTIVE BLADDER: ICD-10-CM

## 2019-09-30 DIAGNOSIS — M17.10 UNILATERAL PRIMARY OSTEOARTHRITIS, UNSPECIFIED KNEE: ICD-10-CM

## 2019-09-30 DIAGNOSIS — Z98.89 OTHER SPECIFIED POSTPROCEDURAL STATES: Chronic | ICD-10-CM

## 2019-09-30 DIAGNOSIS — Z00.00 ENCOUNTER FOR GENERAL ADULT MEDICAL EXAMINATION W/OUT ABNORMAL FINDINGS: ICD-10-CM

## 2019-09-30 DIAGNOSIS — Z00.00 ENCOUNTER FOR GENERAL ADULT MEDICAL EXAMINATION WITHOUT ABNORMAL FINDINGS: ICD-10-CM

## 2019-09-30 DIAGNOSIS — Z86.59 PERSONAL HISTORY OF OTHER MENTAL AND BEHAVIORAL DISORDERS: ICD-10-CM

## 2019-09-30 DIAGNOSIS — Z23 ENCOUNTER FOR IMMUNIZATION: ICD-10-CM

## 2019-09-30 DIAGNOSIS — Z87.898 PERSONAL HISTORY OF OTHER SPECIFIED CONDITIONS: ICD-10-CM

## 2019-09-30 RX ORDER — MELOXICAM 15 MG/1
15 TABLET ORAL
Qty: 30 | Refills: 3 | Status: DISCONTINUED | COMMUNITY
Start: 2019-01-07 | End: 2019-09-30

## 2019-09-30 RX ORDER — TURMERIC ROOT EXTRACT 500 MG
TABLET ORAL
Refills: 0 | Status: DISCONTINUED | COMMUNITY
End: 2019-09-30

## 2019-09-30 RX ORDER — MELOXICAM 15 MG/1
15 TABLET ORAL DAILY
Qty: 30 | Refills: 3 | Status: DISCONTINUED | COMMUNITY
Start: 2018-07-25 | End: 2019-09-30

## 2019-09-30 NOTE — HEALTH RISK ASSESSMENT
[Good] : ~his/her~  mood as  good [Yes] : Yes [Monthly or less (1 pt)] : Monthly or less (1 point) [1 or 2 (0 pts)] : 1 or 2 (0 points) [No] : In the past 12 months have you used drugs other than those required for medical reasons? No [Less than monthly (1 pt)] : Less than monthly (1 point) [No falls in past year] : Patient reported no falls in the past year [0] : 2) Feeling down, depressed, or hopeless: Not at all (0) [Patient declined Retinal Exam] : Patient declined Retinal Exam. [HIV Test offered] : HIV Test offered [Hepatitis C test offered] : Hepatitis C test offered [Financial] : financial [Fully functional (bathing, dressing, toileting, transferring, walking, feeding)] : Fully functional (bathing, dressing, toileting, transferring, walking, feeding) [Fully functional (using the telephone, shopping, preparing meals, housekeeping, doing laundry, using] : Fully functional and needs no help or supervision to perform IADLs (using the telephone, shopping, preparing meals, housekeeping, doing laundry, using transportation, managing medications and managing finances) [] : No [Change in mental status noted] : No change in mental status noted [Language] : denies difficulty with language [Behavior] : denies difficulty with behavior [Learning/Retaining New Information] : denies difficulty learning/retaining new information [Reasoning] : denies difficulty with reasoning [Handling Complex Tasks] : denies difficulty handling complex tasks [Spatial Ability and Orientation] : denies difficulty with spatial ability and orientation [Reports changes in hearing] : Reports no changes in hearing [Reports changes in vision] : Reports no changes in vision [Reports normal functional visual acuity (ie: able to read med bottle)] : Reports poor functional visual acuity.  [Smoke Detector] : no smoke detector [Reports changes in dental health] : Reports no changes in dental health [Safety elements used in home] : no safety elements used in home [Carbon Monoxide Detector] : no carbon monoxide detector [Guns at Home] : no guns at home [Seat Belt] : does not use seat belt [Sunscreen] : does not use sunscreen [Travel to Developing Areas] : does not  travel to developing areas [Caregiver Concerns] : does not have caregiver concerns [TB Exposure] : is not being exposed to tuberculosis [MammogramComments] : Birads 2 [MammogramDate] : 1/31/2019 [PapSmearDate] : 2/27/2017 [PapSmearComments] : Negative  [BoneDensityDate] : 2/1/2019 [BoneDensityComments] : Normal  [ColonoscopyComments] : repeat in 5 years  [ColonoscopyDate] : 3/4/2015

## 2019-09-30 NOTE — REVIEW OF SYSTEMS
[Incontinence] : incontinence [Joint Pain] : joint pain [Negative] : Gastrointestinal [Dysuria] : no dysuria [Hematuria] : no hematuria [Frequency] : no frequency [Joint Stiffness] : no joint stiffness [Joint Swelling] : no joint swelling [Muscle Pain] : no muscle pain [Muscle Weakness] : no muscle weakness [Back Pain] : no back pain [Itching] : no itching [Headache] : no headache [Skin Rash] : no skin rash [Mole Changes] : no mole changes [Dizziness] : no dizziness [Memory Loss] : no memory loss [Unsteady Walking] : no ataxia [Suicidal] : not suicidal [Insomnia] : no insomnia [Anxiety] : no anxiety [Easy Bruising] : no easy bruising [Easy Bleeding] : no easy bleeding [Swollen Glands] : no swollen glands

## 2019-09-30 NOTE — HISTORY OF PRESENT ILLNESS
[de-identified] : MICHAELA CABRERA is a 63 year old female with h/o breast cancer s/p XRT, sentinel lymph node biopsy, tamoxifen and anastrozole, OA of knee, overactive bladder here for annual wellness visit. She has no new complaints. She feels well over all. STill struggling with insurance issues.  [FreeTextEntry1] : annual physical

## 2019-10-01 ENCOUNTER — RESULT REVIEW (OUTPATIENT)
Age: 64
End: 2019-10-01

## 2023-09-22 NOTE — ED ADULT TRIAGE NOTE - NS ED TRIAGE AVPU SCALE
3 seconds or more Alert-The patient is alert, awake and responds to voice. The patient is oriented to time, place, and person. The triage nurse is able to obtain subjective information.

## 2025-01-12 NOTE — ED ADULT TRIAGE NOTE - BP NONINVASIVE SYSTOLIC (MM HG)
Arrives via EMS from home, complaints of AMS, alert and oriented to place and person, LKW 1300. . HX CVA and Seizures   131